# Patient Record
(demographics unavailable — no encounter records)

---

## 2025-05-15 NOTE — PLAN
[FreeTextEntry1] : 40 yo  presents for complex contraception consultation and LNG-IUS inseriton - after counseling, the patient desires LNG-IUD - pt instructed to discontinue COCs today - hcg negative - GC/CT obtained - IUD inserted under transabdominal ultrasound guidance to fundus - fundal placement confirmed on transabdominal ultrasound - all questions/concerns addressed patient satisfaction - rx for oxycodone 5mg #5 no refills sent to pharmacy per pt request - ISTOP [id number] - pt to f/u with Dr. Ro for annual GYN care - letter to Dr. Ro to follow

## 2025-05-15 NOTE — HISTORY OF PRESENT ILLNESS
[FreeTextEntry1] : 42 yo  presents for complex contraception consultation. 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  TAking Xarelto.  SHe is also taking COCs.    OBhx: c/s x4  GYNHx: denies abnl pap smears denies fibroids/cysts/endometriosis denies STIs

## 2025-05-15 NOTE — PHYSICAL EXAM
[MA] : MA [Appropriately responsive] : appropriately responsive [Alert] : alert [No Acute Distress] : no acute distress [Soft] : soft [Non-tender] : non-tender [Oriented x3] : oriented x3 [No Bleeding] : There was no active vaginal bleeding [Normal] : normal [Anteversion] : anteverted [FreeTextEntry2] : William Carmichael